# Patient Record
Sex: FEMALE | Race: BLACK OR AFRICAN AMERICAN | URBAN - METROPOLITAN AREA
[De-identification: names, ages, dates, MRNs, and addresses within clinical notes are randomized per-mention and may not be internally consistent; named-entity substitution may affect disease eponyms.]

---

## 2023-04-20 ENCOUNTER — CLINICAL DOCUMENTATION (OUTPATIENT)
Dept: PSYCHIATRY | Age: 38
End: 2023-04-20
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
## 2023-04-21 ENCOUNTER — CLINICAL DOCUMENTATION (OUTPATIENT)
Dept: PSYCHIATRY | Age: 38
End: 2023-04-21

## 2023-04-24 ENCOUNTER — CLINICAL DOCUMENTATION (OUTPATIENT)
Dept: PSYCHIATRY | Age: 38
End: 2023-04-24

## 2023-04-24 NOTE — PROGRESS NOTES
Patient was given temporary housing over the weekend for her safety. She has agreed to attend counseling and requesting counseling for her 2 children through the [facility]. Case will be monitored.

## 2023-05-04 ENCOUNTER — HOSPITAL ENCOUNTER (OUTPATIENT)
Dept: PSYCHIATRY | Age: 38
Setting detail: THERAPIES SERIES
Discharge: HOME | End: 2023-05-04
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
## 2024-04-09 ENCOUNTER — HOSPITAL ENCOUNTER (OUTPATIENT)
Dept: PSYCHIATRY | Age: 39
Discharge: HOME | End: 2024-04-09

## 2024-04-11 ENCOUNTER — CASE MANAGEMENT (OUTPATIENT)
Dept: PSYCHIATRY | Age: 39
End: 2024-04-11
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
## 2024-04-15 ENCOUNTER — HOSPITAL ENCOUNTER (OUTPATIENT)
Dept: PSYCHIATRY | Age: 39
Setting detail: THERAPIES SERIES
Discharge: HOME | End: 2024-04-15

## 2024-04-18 ENCOUNTER — FOLLOWUP TELEPHONE ENCOUNTER (OUTPATIENT)
Dept: PSYCHIATRY | Age: 39
End: 2024-04-18
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
